# Patient Record
(demographics unavailable — no encounter records)

---

## 2025-07-22 NOTE — HISTORY OF PRESENT ILLNESS
[Born at ___ Wks Gestation] : The patient was born at [unfilled] weeks gestation [[facility]] : [facility] [BW: _____] : weight of [unfilled] [Length: _____] : length of [unfilled] [HC: _____] : head circumference of [unfilled] [DW: _____] : Discharge weight was [unfilled] [Time of Birth: _____] : Time of birth was [unfilled] [Normal] : Normal [Yellow] : yellow [Seedy] : seedy [In Bassinet/Crib] : sleeps in bassinet/crib [On back] : sleeps on back [No] : No cigarette smoke exposure [Rear facing car seat in back seat] : Rear facing car seat in back seat [C/S] : via  section [C/S Indication: ____] : ( [unfilled] ) [(1) _____] : [unfilled] [(5) _____] : [unfilled] [Age: ___] : [unfilled] year old mother [G: ___] : G [unfilled] [P: ___] : P [unfilled] [Rubella (Immune)] : Rubella immune [MBT: ____] : MBT - [unfilled] [TcB: _____] : Transcutaneous Bilirubin [unfilled] mg/dL [Meconium] : meconium [HepBsAG] : HepBsAg negative [HIV] : HIV negative [GBS] : GBS negative [VDRL/RPR (Reactive)] : VDRL/RPR nonreactive [de-identified] : 36 [Loose bedding, pillow, toys, and/or bumpers in crib] : no loose bedding, pillow, toys, and/or bumpers in crib [Hepatitis B Vaccine Given] : Hepatitis B vaccine not given [FreeTextEntry7] : 5d wcc, doing well [de-identified] : formula, will be giving EBM, feeding every 2-3 hours

## 2025-07-22 NOTE — PHYSICAL EXAM

## 2025-07-22 NOTE — DISCUSSION/SUMMARY
[Normal Growth] : growth [Normal Development] : developmental [No Elimination Concerns] : elimination [Continue Regimen] : feeding [No Skin Concerns] : skin [Normal Sleep Pattern] : sleep [None] : no known medical problems [Anticipatory Guidance Given] : Anticipatory guidance addressed as per the history of present illness section [ Transition] :  transition [ Care] :  care [Nutritional Adequacy] : nutritional adequacy [Parental Well-Being] : parental well-being [Safety] : safety [Hepatitis B In Hospital] : Hepatitis B not administered while in the hospital [Hepatitis B] : hepatitis B [No Medications] : ~He/She~ is not on any medications [Mother] : mother [Father] : father [Parental Concerns Addressed] : Parental concerns addressed [] : The components of the vaccine(s) to be administered today are listed in the plan of care. The disease(s) for which the vaccine(s) are intended to prevent and the risks have been discussed with the caretaker.  The risks are also included in the appropriate vaccination information statements which have been provided to the patient's caregiver.  The caregiver has given consent to vaccinate.

## 2025-07-29 NOTE — HISTORY OF PRESENT ILLNESS
[de-identified] : weight check [FreeTextEntry6] : formula and EBM feeding every 2.5-3 hours voiding and stooling regularly

## 2025-07-29 NOTE — PLAN
[TextEntry] : continue ad butch feeds monitor intake and output recheck at 1-month visit, sooner as needed